# Patient Record
Sex: MALE | Race: WHITE | NOT HISPANIC OR LATINO | URBAN - METROPOLITAN AREA
[De-identification: names, ages, dates, MRNs, and addresses within clinical notes are randomized per-mention and may not be internally consistent; named-entity substitution may affect disease eponyms.]

---

## 2019-01-24 ENCOUNTER — OUTPATIENT (OUTPATIENT)
Dept: OUTPATIENT SERVICES | Facility: HOSPITAL | Age: 54
LOS: 1 days | End: 2019-01-24
Payer: COMMERCIAL

## 2019-01-24 VITALS
HEIGHT: 68 IN | SYSTOLIC BLOOD PRESSURE: 142 MMHG | HEART RATE: 78 BPM | WEIGHT: 190.04 LBS | DIASTOLIC BLOOD PRESSURE: 90 MMHG | OXYGEN SATURATION: 99 % | RESPIRATION RATE: 20 BRPM | TEMPERATURE: 99 F

## 2019-01-24 DIAGNOSIS — S66.822A LACERATION OF OTHER SPECIFIED MUSCLES, FASCIA AND TENDONS AT WRIST AND HAND LEVEL, LEFT HAND, INITIAL ENCOUNTER: ICD-10-CM

## 2019-01-24 DIAGNOSIS — S69.90XA UNSPECIFIED INJURY OF UNSPECIFIED WRIST, HAND AND FINGER(S), INITIAL ENCOUNTER: ICD-10-CM

## 2019-01-24 LAB
HCT VFR BLD CALC: 46.7 % — SIGNIFICANT CHANGE UP (ref 39–50)
HGB BLD-MCNC: 15.5 G/DL — SIGNIFICANT CHANGE UP (ref 13–17)
MCHC RBC-ENTMCNC: 33.2 GM/DL — SIGNIFICANT CHANGE UP (ref 32–36)
MCHC RBC-ENTMCNC: 33.6 PG — SIGNIFICANT CHANGE UP (ref 27–34)
MCV RBC AUTO: 101.3 FL — HIGH (ref 80–100)
PLATELET # BLD AUTO: 278 K/UL — SIGNIFICANT CHANGE UP (ref 150–400)
RBC # BLD: 4.61 M/UL — SIGNIFICANT CHANGE UP (ref 4.2–5.8)
RBC # FLD: 13.6 % — SIGNIFICANT CHANGE UP (ref 10.3–14.5)
WBC # BLD: 11.24 K/UL — HIGH (ref 3.8–10.5)
WBC # FLD AUTO: 11.24 K/UL — HIGH (ref 3.8–10.5)

## 2019-01-24 PROCEDURE — 85027 COMPLETE CBC AUTOMATED: CPT

## 2019-01-24 PROCEDURE — G0463: CPT

## 2019-01-24 RX ORDER — SODIUM CHLORIDE 9 MG/ML
3 INJECTION INTRAMUSCULAR; INTRAVENOUS; SUBCUTANEOUS EVERY 8 HOURS
Qty: 0 | Refills: 0 | Status: DISCONTINUED | OUTPATIENT
Start: 2019-01-28 | End: 2019-02-12

## 2019-01-24 RX ORDER — LIDOCAINE HCL 20 MG/ML
0.2 VIAL (ML) INJECTION ONCE
Qty: 0 | Refills: 0 | Status: DISCONTINUED | OUTPATIENT
Start: 2019-01-28 | End: 2019-02-12

## 2019-01-24 NOTE — H&P PST ADULT - PROBLEM SELECTOR PLAN 1
scheduled for left thumb extension tendon repair   preop instruction given, verbalized understanding

## 2019-01-24 NOTE — H&P PST ADULT - NSANTHOSAYNRD_GEN_A_CORE
No. BRITTNEY screening performed.  STOP BANG Legend: 0-2 = LOW Risk; 3-4 = INTERMEDIATE Risk; 5-8 = HIGH Risk

## 2019-01-24 NOTE — H&P PST ADULT - INTERPRETER'S NAME
Discussion/Summary   Biopsy was consistent with a wart.  No further treatment is needed at this time.         Verified Results  TISSUE BIOPSY 2017 12:01AM MANUELITO DAN     Test Name Result Flag Reference   SURGICAL PATHOLOGY (Report) O    Name: FLORIDA LIN         MRN:   [id number]   /Age:1984 (Age: 33)       Visit#: [id number]   Sex:M             Surgical Pathology Report      Client:  [facility]      Submitting Physician: Manuelito Dan MD      Date Specimen Collected: 17      Accession #: [id number]   Date Specimen Received: 17      Requisition   #:[id number]   Date Reported:      2017 13:30  Location:         ______________________________________________________________________________   Pathologic Diagnosis :   Skin, right forearm; biopsy:   -Inflamed verruca vulgaris      [place] [address]   [phone]               Diagnosis Comment:               Prachi Fields MD   ** Electronic Signature (KH) 2017 13:30 **   ______________________________________________________________________________      Clinical Information:   Seborrheic keratoses, inflamed      Specimen(s) Submitted:    3 mm punch biopsy from right forearm      Gross Description:   A: The specimen is received in formalin, with proper patient identification,   labeled right forearm and consists of a punch biopsy of dark brown skin   measuring 0.4 cm in diameter and 0.3 cm in depth. The skin surface has a flat   white lesion measuring 0.3 x 0.3 cm and abuts several margins. The biopsy   margin is inked black. Sections submitted:      A1:  entire specimen in screen cassette      MED 2017 11:08 AM         Intraoperative Diagnosis:                Microscopic Description:               KUN/kun 17      The attending pathologist whose signature appears on this report has reviewed   the  diagnostic studies and has edited the gross and/or microscopic portion of   the report rendering the final diagnosis.      The immunohistochemical, FISH, or ALYSSA reagents (if any) utilized in this test   were developed and their performance characteristics determined by [facility]   [facility]atories. Some of the immunohistochemical reagents have not been cleared   or approved by the U.S. Food and Drug Administration. The FDA has determined   that such clearance or approval is not necessary. This test is used for   clinical purposes. It should not be regarded as investigational or for   research. This laboratory is certified under the Clinical Laboratory   Improvement Amendments of 1988 (CLIA) as qualified to perform high complexity   clinical laboratory testing. The appropriate controls were run and show   appropriate reactivity. Since FISH and/or immunohistochemistry for estrogen   receptor, progesterone receptor, and HER2/atif have not been validated on   decalcified tissue, such results should be interpreted with caution given the   likelihood of false negativity.      Fee Codes:    A: [id number], [id number]      Performing Lab Location (Unless otherwise specified):   [address] [address]        [place]

## 2019-01-24 NOTE — H&P PST ADULT - HISTORY OF PRESENT ILLNESS
53 year old Hungarian speaking male with left thumb injury on 1/16/2019, is being seen for scheduled left thumb extensor tendon repair on 1/28/2019.

## 2019-01-24 NOTE — H&P PST ADULT - FAMILY HISTORY
Mother  Still living? Yes, Estimated age: 61-70  Family history of endocrine disorder, Age at diagnosis: Age Unknown

## 2019-01-27 ENCOUNTER — TRANSCRIPTION ENCOUNTER (OUTPATIENT)
Age: 54
End: 2019-01-27

## 2019-01-28 ENCOUNTER — RESULT REVIEW (OUTPATIENT)
Age: 54
End: 2019-01-28

## 2019-01-28 ENCOUNTER — OUTPATIENT (OUTPATIENT)
Dept: OUTPATIENT SERVICES | Facility: HOSPITAL | Age: 54
LOS: 1 days | End: 2019-01-28
Payer: COMMERCIAL

## 2019-01-28 VITALS
RESPIRATION RATE: 20 BRPM | HEART RATE: 65 BPM | OXYGEN SATURATION: 100 % | TEMPERATURE: 98 F | DIASTOLIC BLOOD PRESSURE: 79 MMHG | SYSTOLIC BLOOD PRESSURE: 125 MMHG

## 2019-01-28 VITALS
DIASTOLIC BLOOD PRESSURE: 90 MMHG | RESPIRATION RATE: 20 BRPM | TEMPERATURE: 99 F | SYSTOLIC BLOOD PRESSURE: 142 MMHG | WEIGHT: 190.04 LBS | HEIGHT: 68 IN | HEART RATE: 78 BPM | OXYGEN SATURATION: 99 %

## 2019-01-28 DIAGNOSIS — S66.822A LACERATION OF OTHER SPECIFIED MUSCLES, FASCIA AND TENDONS AT WRIST AND HAND LEVEL, LEFT HAND, INITIAL ENCOUNTER: ICD-10-CM

## 2019-01-28 PROCEDURE — 88304 TISSUE EXAM BY PATHOLOGIST: CPT

## 2019-01-28 PROCEDURE — 88304 TISSUE EXAM BY PATHOLOGIST: CPT | Mod: 26

## 2019-01-28 PROCEDURE — 26410 REPAIR HAND TENDON: CPT | Mod: LT

## 2019-01-28 PROCEDURE — 26541 REPAIR HAND JOINT WITH GRAFT: CPT | Mod: LT

## 2019-01-28 RX ORDER — ONDANSETRON 8 MG/1
4 TABLET, FILM COATED ORAL ONCE
Qty: 0 | Refills: 0 | Status: DISCONTINUED | OUTPATIENT
Start: 2019-01-28 | End: 2019-02-12

## 2019-01-28 RX ORDER — CELECOXIB 200 MG/1
200 CAPSULE ORAL ONCE
Qty: 0 | Refills: 0 | Status: DISCONTINUED | OUTPATIENT
Start: 2019-01-28 | End: 2019-02-12

## 2019-01-28 RX ORDER — CELECOXIB 200 MG/1
200 CAPSULE ORAL ONCE
Qty: 0 | Refills: 0 | Status: COMPLETED | OUTPATIENT
Start: 2019-01-28 | End: 2019-01-28

## 2019-01-28 RX ORDER — ACETAMINOPHEN 500 MG
1000 TABLET ORAL ONCE
Qty: 0 | Refills: 0 | Status: COMPLETED | OUTPATIENT
Start: 2019-01-28 | End: 2019-01-28

## 2019-01-28 RX ORDER — SODIUM CHLORIDE 9 MG/ML
1000 INJECTION, SOLUTION INTRAVENOUS
Qty: 0 | Refills: 0 | Status: DISCONTINUED | OUTPATIENT
Start: 2019-01-28 | End: 2019-02-12

## 2019-01-28 RX ADMIN — CELECOXIB 200 MILLIGRAM(S): 200 CAPSULE ORAL at 06:58

## 2019-01-28 RX ADMIN — Medication 1000 MILLIGRAM(S): at 06:57

## 2019-01-28 NOTE — ASU DISCHARGE PLAN (ADULT/PEDIATRIC). - NOTIFY
Increased Irritability or Sluggishness/Swelling that continues/Persistent Nausea and Vomiting/Fever greater than 101/Inability to Tolerate Liquids or Foods/Pain not relieved by Medications/Numbness, tingling/Numbness, color, or temperature change to extremity/Bleeding that does not stop/Excessive Diarrhea/Unable to Urinate

## 2019-01-31 LAB — SURGICAL PATHOLOGY STUDY: SIGNIFICANT CHANGE UP

## 2019-03-26 PROBLEM — S69.90XA UNSPECIFIED INJURY OF UNSPECIFIED WRIST, HAND AND FINGER(S), INITIAL ENCOUNTER: Chronic | Status: ACTIVE | Noted: 2019-01-24

## 2019-03-27 PROBLEM — Z00.00 ENCOUNTER FOR PREVENTIVE HEALTH EXAMINATION: Status: ACTIVE | Noted: 2019-03-27

## 2019-03-29 ENCOUNTER — APPOINTMENT (OUTPATIENT)
Dept: OPHTHALMOLOGY | Facility: CLINIC | Age: 54
End: 2019-03-29
Payer: COMMERCIAL

## 2019-03-29 DIAGNOSIS — H11.003 UNSPECIFIED PTERYGIUM OF EYE, BILATERAL: ICD-10-CM

## 2019-03-29 DIAGNOSIS — H25.093 OTHER AGE-RELATED INCIPIENT CATARACT, BILATERAL: ICD-10-CM

## 2019-03-29 DIAGNOSIS — H40.003 PREGLAUCOMA, UNSPECIFIED, BILATERAL: ICD-10-CM

## 2019-03-29 PROCEDURE — 92133 CPTRZD OPH DX IMG PST SGM ON: CPT

## 2019-03-29 PROCEDURE — 92004 COMPRE OPH EXAM NEW PT 1/>: CPT

## 2019-06-13 ENCOUNTER — APPOINTMENT (OUTPATIENT)
Dept: OPHTHALMOLOGY | Facility: CLINIC | Age: 54
End: 2019-06-13

## 2019-12-03 ENCOUNTER — OUTPATIENT (OUTPATIENT)
Dept: OUTPATIENT SERVICES | Facility: HOSPITAL | Age: 54
LOS: 1 days | Discharge: HOME | End: 2019-12-03
Payer: OTHER MISCELLANEOUS

## 2019-12-03 ENCOUNTER — RESULT REVIEW (OUTPATIENT)
Age: 54
End: 2019-12-03

## 2019-12-03 PROCEDURE — 88305 TISSUE EXAM BY PATHOLOGIST: CPT | Mod: 26

## 2019-12-09 LAB — SURGICAL PATHOLOGY STUDY: SIGNIFICANT CHANGE UP

## 2021-04-01 NOTE — PRE-ANESTHESIA EVALUATION ADULT - WEIGHT IN KG
Referred by: Mitra Dietrich MD; Medical Diagnosis (from order):    Diagnosis Information      Diagnosis    V45.89 (ICD-9-CM) - Z98.890 (ICD-10-CM) - History of lumbar laminectomy    724.2 (ICD-9-CM) - M54.5 (ICD-10-CM) - Lumbar pain    789.9 (ICD-9-CM) - R19.8 (ICD-10-CM) - Abdominal weakness                Daily Treatment Note -  Physical Therapy    Visit:  8   Diagnosis Precautions: Date of Surgery: 2/5/2021; Surgery: Lumbar 4-5  Laminectomy       Patient alert and oriented X3.  Chart reviewed at time of initial evaluation (relevant co-morbidities, allergies, tests and medications listed): Type 2 DM, right RTC 3-4 years ago, right knee meniscus tear, HTN, cardiac stent.  Patient alert and oriented X3.    SUBJECTIVE                                                                                                             Patient report his symptoms are less, does still get the nerve pain, but comes quickly and goes quickly. Feels the soft tissue work trial last session was helpful.        DOS 2/5/21  Functional Change: delegating work to others more.    Pain / Symptoms:  Pain rating (out of 10): Current: 2     OBJECTIVE                                                                                                                     Observation:   Comments / Details: Arrived 6 minutes late for appointment.  Increased lateral trunk sway with the cane and brace with antalgic pattern noted.           TREATMENT                                                                                                                  Therapeutic Exercise:  Nustep level 5, seat 12, legs only. Subjective information obtained during warm up.   seated hamstring stretch 3 x 20 seconds  Standing calf stretch 3 x 20 seconds       Standing in parallel bars:  Abdominal brace with cue for slow and controlled motion:  March, x10.   Hip abduction, x10.   standing hip flexion with straight leg x 10 each.   Heel raises/toe raises x  10  Shallow forward lunge x 10 each.   Cues for core stabilization throughout and neutral lower extremity alignment.            Manual Therapy:  Left side lying, manual skills to the right low back and clearing of the iliac crest. Some attention to the portal incision as well.    Activities of Daily Living/Self Care:  Continued focus on core stabilization with functional tasks.      Skilled input: as detailed above, posture correction and verbal instruction/cues    Writer verbally educated and received verbal consent for hand placement, positioning of patient, and techniques to be performed today from patient for therapist position for techniques, hand placement and palpation for techniques and clothing adjustments for techniques as described above and how they are pertinent to the patient's plan of care.    Home Exercise Program: Exercises form home PT:  Standing heel raises  Side stepping  Butt kicks  High knees with hip ER, frog leg  Standing hip abdominal  Standing balance equal stance with head tunrs, nods and eyes closed. Also with staggered stance.    Access Code: [email or web address]  URL: [email or web address]  Date: 03/18/2021  Prepared by: Brii Ta    Exercises  Seated Hamstring Stretch - 1-2 x daily - 3 reps - 20 -30 seconds hold  Standing Gastroc Stretch at Counter - 1-2 x daily - 3 reps - 20-30 seconds hold  Seated Transversus Abdominis Bracing - 1-2 x daily - 5 reps - 1-2 sets - 5- 10 seconds hold  Seated Long Arc Quad - 1-2 x daily - 10 reps - 1-2 sets  Seated March - 1-2 x daily - 10 reps - 1-2 sets  Seated Ankle Plantar Flexion with Resistance Loop - 1-2 x daily - 10 reps - 1-2 sets  Sit to Stand - 2 x daily - 7 x weekly - 2 sets - 5 reps - 5 hold       ASSESSMENT                                                                                                             The nerve pain noted mostly with sit to stand. Fells better after the session, however still rated a 2/10. Will need to  continue to be minful of the issues he has with his knee moving forward with any step progression etc. Patient more mindful of activity the resulting pain.    Pain/symptoms after session: 2    Patient Education:   Results of above outlined education: Demonstrates understanding, Needs reinforcement and Verbalizes understanding      PLAN                                                                                                                           Suggestions for next session as indicated: Progress per plan of care  Progression of LE stretching and stabilization program.   progression of closed chain with stabilization   Core stabilization, stairs caution as patient reports this is an issues with his knees.   Band resistance for core in stance.     Assess the benefit of the manual intervention. Progress accordingly, the above ideas as able.         Therapy procedure time and total treatment time can be found documented on the Time Entry flowsheet   86.2

## 2022-07-19 PROBLEM — H40.003 GLAUCOMA SUSPECT OF BOTH EYES: Status: ACTIVE | Noted: 2019-03-29

## 2024-05-21 NOTE — ASU PATIENT PROFILE, ADULT - MEDICATION HERBAL REMEDIES, PROFILE
[FreeTextEntry1] : -penicillin allergy, already treated with doxycycline, can trial Levaquin, disused possible side effects -Flonase  f/u with ENT  -f/u CT sinus and MRI head  -MRI head in setting of intractable headache, worse with leaning forward, different from previous headaches  -discussed ER precautions 
no